# Patient Record
(demographics unavailable — no encounter records)

---

## 2025-05-01 NOTE — HEALTH RISK ASSESSMENT
[0] : 2) Feeling down, depressed, or hopeless: Not at all (0) [PHQ-2 Negative - No further assessment needed] : PHQ-2 Negative - No further assessment needed [Never] : Never [0-4] : 0-4 [[id number]] : 0

## 2025-05-01 NOTE — PHYSICAL EXAM
[Normal] : no acute distress, well nourished, well developed and well-appearing [No JVD] : no jugular venous distention [No Lymphadenopathy] : no lymphadenopathy [No Respiratory Distress] : no respiratory distress  [No Accessory Muscle Use] : no accessory muscle use [Clear to Auscultation] : lungs were clear to auscultation bilaterally [Normal Rate] : normal rate  [Regular Rhythm] : with a regular rhythm [Normal S1, S2] : normal S1 and S2 [Soft] : abdomen soft [Non Tender] : non-tender [Non-distended] : non-distended [No HSM] : no HSM [Normal Bowel Sounds] : normal bowel sounds [No CVA Tenderness] : no CVA  tenderness [No Spinal Tenderness] : no spinal tenderness [Coordination Grossly Intact] : coordination grossly intact [Normal Gait] : normal gait [Normal Affect] : the affect was normal [Alert and Oriented x3] : oriented to person, place, and time

## 2025-05-01 NOTE — HISTORY OF PRESENT ILLNESS
[FreeTextEntry1] : Pt would like to establish care and would also like STD testing PHQ-2(0PT) [de-identified] : Mr. Roman is a 48 year old man who presents today to [facility] care. He is in generally good health wiht no chronic diseases and only medication he takes is Valtrex for oral herpes which he has had as a child. He also takes an amino acid OTC supplement. He has been hospitalized twice in his life both times for concussions due to sports injuries. He has never had any surgeries. He has been sexually active with both males and females in the past but has been exclusively sexually active with females and has had 6 sexual partners in the past year. He has never had an STI.   He works at [employer] and is happy at his workplace. He drinks alcoohl two nights a week and will have 4-5 drinks. He does not drink at home or alone and has never had withdrawals. He has a strong family history of alcoholism which he is very cognizant of. He uses marijuana chocolates 3-4 times a week but does not smoke either cigarretes or marijuana. He denies current drug use, but has tried rosa in the past. He runs three miles three times a week and bikes regularly. He has kept a vegan diet for the past 25 years.   Otherwise, he offers no complaints today and feels well.   Previous PCP: Dr. Still, [facility]. All labs one year ago wnl.

## 2025-05-01 NOTE — ASSESSMENT
[Vaccines Reviewed] : Immunizations reviewed today. Please see immunization details in the vaccine log within the immunization flowsheet.  [FreeTextEntry1] : #Vaccination status  Is Flu, COVID, and Hep B vaccinated. However, does not remember all of his vaccinations. He will attempt to obtain records from prior PCP and send to office.   #Need for screening colonoscopy  Referral sent today

## 2025-05-01 NOTE — END OF VISIT
[] : Resident [FreeTextEntry3] : I met with patient and confirmed history and exam as detailed by resident and concur with assessment and plan of care.  Healthy 48-year-old man who has sex with women for healthcare maintenance visit.  H & P as documented.  Works in [employer].

## 2025-05-01 NOTE — REVIEW OF SYSTEMS
[Negative] : Respiratory [Chest Pain] : no chest pain [Palpitations] : no palpitations [Claudication] : no  leg claudication [Lower Ext Edema] : no lower extremity edema [Orthopena] : no orthopnea [Paroxysmal Nocturnal Dyspnea] : no paroxysmal nocturnal dyspnea [Shortness Of Breath] : no shortness of breath [Wheezing] : no wheezing [Abdominal Pain] : no abdominal pain [Nausea] : no nausea [Vomiting] : no vomiting [Heartburn] : no heartburn [Dysuria] : no dysuria [Hematuria] : no hematuria [Headache] : no headache [Memory Loss] : no memory loss